# Patient Record
Sex: MALE
[De-identification: names, ages, dates, MRNs, and addresses within clinical notes are randomized per-mention and may not be internally consistent; named-entity substitution may affect disease eponyms.]

---

## 2023-10-24 ENCOUNTER — TRANSCRIBE ORDERS (OUTPATIENT)
Dept: OTHER | Age: 53
End: 2023-10-24

## 2023-10-24 DIAGNOSIS — G25.82 STIFF PERSON SYNDROME: Primary | ICD-10-CM

## 2023-10-27 ENCOUNTER — TELEPHONE (OUTPATIENT)
Dept: NEUROLOGY | Facility: CLINIC | Age: 53
End: 2023-10-27
Payer: MEDICARE

## 2023-10-27 NOTE — TELEPHONE ENCOUNTER
No answer x2 for the patient to call back and schedule the following:    Appointment type: New Muscular Dystrophy  Provider: Dr. Steiner  Return date: First Available   Specialty phone number: [phone]  Additional appointment(s) needed: N/A  Additonal Notes: Ok to schedule w/ Dr Steiner as NMD.    Ana Zhu on 10/27/2023 at 12:07 PM

## 2023-11-03 NOTE — TELEPHONE ENCOUNTER
Action 11/3/23 MV 2.21pm   Action Taken 1) called pt to obtain verbal consent for [facility] records - pt provided consent. Pt stated he also saw neurologists down in [place] but those visits were for different reasons not related to the reason for his visit with us.  2) imaging request faxed to [facility]     Action 11/15/23 MV 12.27pm   Action Taken Images resolved in PACS       RECORDS RECEIVED FROM: external   REASON FOR VISIT: Stiff person syndrome    Date of Appt: 1/29/24   NOTES (FOR ALL VISITS) STATUS DETAILS   OFFICE NOTE from referring provider Care Everywhere Dr David Elliott @ [facility] Neurology:  10/23/23  4/21/23  10/21/22  4/21/22  (Additional encounters)   OFFICE NOTE from other specialist Care Everywhere Dr Roberta Hodges @ [facility]:  1/29/19    Dr Fina Salmon @ [facility] Neuroogy:  1/11/18   EMG Care Everywhere [facility]:  1/31/19    [facility]:  3/21/17   MEDICATION LIST Care Everywhere    IMAGING  (FOR ALL VISITS)     MRI (HEAD, NECK, SPINE) PACS [facility]:  MRI Brain 1/26/17  MRI Cervical Spine 1/26/17  MRI Thoracic Spine 1/26/17

## 2024-01-29 ENCOUNTER — PRE VISIT (OUTPATIENT)
Dept: NEUROLOGY | Facility: CLINIC | Age: 54
End: 2024-01-29